# Patient Record
Sex: FEMALE | Race: BLACK OR AFRICAN AMERICAN | Employment: FULL TIME | URBAN - METROPOLITAN AREA
[De-identification: names, ages, dates, MRNs, and addresses within clinical notes are randomized per-mention and may not be internally consistent; named-entity substitution may affect disease eponyms.]

---

## 2019-11-13 ENCOUNTER — HOSPITAL ENCOUNTER (EMERGENCY)
Age: 24
Discharge: HOME | End: 2019-11-13
Attending: EMERGENCY MEDICINE

## 2019-11-13 VITALS
BODY MASS INDEX: 23.04 KG/M2 | WEIGHT: 130 LBS | TEMPERATURE: 98.4 F | SYSTOLIC BLOOD PRESSURE: 149 MMHG | RESPIRATION RATE: 16 BRPM | HEART RATE: 83 BPM | OXYGEN SATURATION: 100 % | HEIGHT: 63 IN | DIASTOLIC BLOOD PRESSURE: 93 MMHG

## 2019-11-13 DIAGNOSIS — L02.31 GLUTEAL ABSCESS: Primary | ICD-10-CM

## 2019-11-13 PROCEDURE — 4500000022 HC ED LEVEL 2 PROCEDURE

## 2019-11-13 PROCEDURE — 6360000002 HC RX W HCPCS: Performed by: PHYSICIAN ASSISTANT

## 2019-11-13 PROCEDURE — 96372 THER/PROPH/DIAG INJ SC/IM: CPT

## 2019-11-13 PROCEDURE — 99282 EMERGENCY DEPT VISIT SF MDM: CPT

## 2019-11-13 RX ORDER — LIDOCAINE HYDROCHLORIDE AND EPINEPHRINE 10; 10 MG/ML; UG/ML
20 INJECTION, SOLUTION INFILTRATION; PERINEURAL ONCE
Status: DISCONTINUED | OUTPATIENT
Start: 2019-11-13 | End: 2019-11-13 | Stop reason: HOSPADM

## 2019-11-13 RX ORDER — KETOROLAC TROMETHAMINE 30 MG/ML
15 INJECTION, SOLUTION INTRAMUSCULAR; INTRAVENOUS ONCE
Status: COMPLETED | OUTPATIENT
Start: 2019-11-13 | End: 2019-11-13

## 2019-11-13 RX ORDER — CEPHALEXIN 500 MG/1
500 CAPSULE ORAL 4 TIMES DAILY
Qty: 28 CAPSULE | Refills: 0 | Status: SHIPPED | OUTPATIENT
Start: 2019-11-13 | End: 2019-11-20

## 2019-11-13 RX ORDER — SULFAMETHOXAZOLE AND TRIMETHOPRIM 800; 160 MG/1; MG/1
1 TABLET ORAL 2 TIMES DAILY
Qty: 14 TABLET | Refills: 0 | Status: SHIPPED | OUTPATIENT
Start: 2019-11-13 | End: 2019-11-20

## 2019-11-13 RX ADMIN — KETOROLAC TROMETHAMINE 15 MG: 30 INJECTION, SOLUTION INTRAMUSCULAR at 19:11

## 2019-11-13 ASSESSMENT — PAIN DESCRIPTION - DESCRIPTORS
DESCRIPTORS: JABBING;NAGGING
DESCRIPTORS: ACHING;CONSTANT

## 2019-11-13 ASSESSMENT — PAIN DESCRIPTION - PROGRESSION: CLINICAL_PROGRESSION: NOT CHANGED

## 2019-11-13 ASSESSMENT — PAIN DESCRIPTION - PAIN TYPE
TYPE: ACUTE PAIN
TYPE: ACUTE PAIN

## 2019-11-13 ASSESSMENT — PAIN SCALES - GENERAL
PAINLEVEL_OUTOF10: 9
PAINLEVEL_OUTOF10: 7
PAINLEVEL_OUTOF10: 9

## 2019-11-13 ASSESSMENT — PAIN DESCRIPTION - FREQUENCY: FREQUENCY: CONTINUOUS

## 2019-11-13 ASSESSMENT — PAIN DESCRIPTION - ONSET: ONSET: ON-GOING

## 2019-11-13 ASSESSMENT — PAIN DESCRIPTION - LOCATION
LOCATION: BUTTOCKS
LOCATION: BACK

## 2019-11-13 ASSESSMENT — PAIN DESCRIPTION - ORIENTATION: ORIENTATION: MID;LOWER

## 2019-11-15 ENCOUNTER — TELEPHONE (OUTPATIENT)
Dept: SURGERY | Age: 24
End: 2019-11-15

## 2020-04-22 ENCOUNTER — TELEPHONE (OUTPATIENT)
Dept: SURGERY | Age: 25
End: 2020-04-22

## 2021-05-24 ENCOUNTER — HOSPITAL ENCOUNTER (EMERGENCY)
Age: 26
Discharge: HOME | End: 2021-05-24
Attending: EMERGENCY MEDICINE

## 2021-05-24 VITALS
RESPIRATION RATE: 16 BRPM | OXYGEN SATURATION: 99 % | BODY MASS INDEX: 25.52 KG/M2 | HEART RATE: 97 BPM | TEMPERATURE: 98 F | SYSTOLIC BLOOD PRESSURE: 139 MMHG | HEIGHT: 60 IN | DIASTOLIC BLOOD PRESSURE: 93 MMHG | WEIGHT: 130 LBS

## 2021-05-24 DIAGNOSIS — R10.30 LOWER ABDOMINAL PAIN: Primary | ICD-10-CM

## 2021-05-24 LAB
ABO/RH: NORMAL
ANION GAP SERPL CALCULATED.3IONS-SCNC: 12 MMOL/L (ref 3–16)
ANTIBODY SCREEN: NORMAL
BACTERIA WET PREP: NORMAL
BASOPHILS ABSOLUTE: 0.1 K/UL (ref 0–0.2)
BASOPHILS RELATIVE PERCENT: 0.8 %
BILIRUBIN URINE: NEGATIVE
BLOOD, URINE: NEGATIVE
BUN BLDV-MCNC: 9 MG/DL (ref 7–20)
CALCIUM SERPL-MCNC: 9.2 MG/DL (ref 8.3–10.6)
CHLORIDE BLD-SCNC: 102 MMOL/L (ref 99–110)
CLARITY: CLEAR
CLUE CELLS: NORMAL
CO2: 22 MMOL/L (ref 21–32)
COLOR: YELLOW
CREAT SERPL-MCNC: 0.8 MG/DL (ref 0.6–1.1)
EOSINOPHILS ABSOLUTE: 0.2 K/UL (ref 0–0.6)
EOSINOPHILS RELATIVE PERCENT: 2 %
EPITHELIAL CELLS WET PREP: NORMAL
GFR AFRICAN AMERICAN: >60
GFR NON-AFRICAN AMERICAN: >60
GLUCOSE BLD-MCNC: 77 MG/DL (ref 70–99)
GLUCOSE URINE: NEGATIVE MG/DL
HCG QUALITATIVE: NEGATIVE
HCT VFR BLD CALC: 32.3 % (ref 36–48)
HEMOGLOBIN: 10.3 G/DL (ref 12–16)
KETONES, URINE: NEGATIVE MG/DL
LEUKOCYTE ESTERASE, URINE: NEGATIVE
LYMPHOCYTES ABSOLUTE: 3.5 K/UL (ref 1–5.1)
LYMPHOCYTES RELATIVE PERCENT: 37.7 %
MAGNESIUM: 2 MG/DL (ref 1.8–2.4)
MCH RBC QN AUTO: 23.8 PG (ref 26–34)
MCHC RBC AUTO-ENTMCNC: 31.8 G/DL (ref 31–36)
MCV RBC AUTO: 74.8 FL (ref 80–100)
MICROSCOPIC EXAMINATION: NORMAL
MONOCYTES ABSOLUTE: 1 K/UL (ref 0–1.3)
MONOCYTES RELATIVE PERCENT: 10.3 %
NEUTROPHILS ABSOLUTE: 4.6 K/UL (ref 1.7–7.7)
NEUTROPHILS RELATIVE PERCENT: 49.2 %
NITRITE, URINE: NEGATIVE
PDW BLD-RTO: 16.7 % (ref 12.4–15.4)
PH UA: 7 (ref 5–8)
PLATELET # BLD: 341 K/UL (ref 135–450)
PMV BLD AUTO: 9 FL (ref 5–10.5)
POTASSIUM REFLEX MAGNESIUM: 3.5 MMOL/L (ref 3.5–5.1)
PROTEIN UA: NEGATIVE MG/DL
RBC # BLD: 4.32 M/UL (ref 4–5.2)
RBC WET PREP: NORMAL
SODIUM BLD-SCNC: 136 MMOL/L (ref 136–145)
SOURCE WET PREP: NORMAL
SPECIFIC GRAVITY UA: 1.01 (ref 1–1.03)
TRICHOMONAS PREP: NORMAL
URINE TYPE: NORMAL
UROBILINOGEN, URINE: 0.2 E.U./DL
WBC # BLD: 9.3 K/UL (ref 4–11)
WBC WET PREP: NORMAL
YEAST WET PREP: NORMAL

## 2021-05-24 PROCEDURE — 87591 N.GONORRHOEAE DNA AMP PROB: CPT

## 2021-05-24 PROCEDURE — 84703 CHORIONIC GONADOTROPIN ASSAY: CPT

## 2021-05-24 PROCEDURE — 86900 BLOOD TYPING SEROLOGIC ABO: CPT

## 2021-05-24 PROCEDURE — 83735 ASSAY OF MAGNESIUM: CPT

## 2021-05-24 PROCEDURE — 87210 SMEAR WET MOUNT SALINE/INK: CPT

## 2021-05-24 PROCEDURE — 80048 BASIC METABOLIC PNL TOTAL CA: CPT

## 2021-05-24 PROCEDURE — 86850 RBC ANTIBODY SCREEN: CPT

## 2021-05-24 PROCEDURE — 99283 EMERGENCY DEPT VISIT LOW MDM: CPT

## 2021-05-24 PROCEDURE — 86901 BLOOD TYPING SEROLOGIC RH(D): CPT

## 2021-05-24 PROCEDURE — 85025 COMPLETE CBC W/AUTO DIFF WBC: CPT

## 2021-05-24 PROCEDURE — 81003 URINALYSIS AUTO W/O SCOPE: CPT

## 2021-05-24 ASSESSMENT — ENCOUNTER SYMPTOMS
SHORTNESS OF BREATH: 0
NAUSEA: 0
CONSTIPATION: 0
ABDOMINAL PAIN: 0
WHEEZING: 0
VOMITING: 0
BLOOD IN STOOL: 0
EYE ITCHING: 0
COUGH: 0
DIARRHEA: 0
EYE DISCHARGE: 0
RHINORRHEA: 0
SORE THROAT: 0

## 2021-05-24 ASSESSMENT — PAIN DESCRIPTION - DESCRIPTORS: DESCRIPTORS: CRAMPING

## 2021-05-24 ASSESSMENT — PAIN DESCRIPTION - LOCATION: LOCATION: ABDOMEN

## 2021-05-24 NOTE — ED PROVIDER NOTES
[facility] RESIDENT NOTE     Date of evaluation: 5/24/2021    ADDENDUM:      Care of this patient was assumed from Dr. Lewis Grayson. The patient was seen for Abdominal Pain (pt reports lower abd pain/cramping x few days with some spotting. last period approx 2 weeks ago. )  . The patient's initial evaluation and plan have been discussed with the prior provider who initially evaluated the patient. Nursing Notes, Past Medical Hx, Past Surgical Hx, Social Hx, Allergies, and Family Hx were all reviewed.     Diagnostic Results     RADIOLOGY:  No orders to display       LABS:   Results for orders placed or performed during the hospital encounter of 05/24/21   Wet prep, genital    Specimen: Vaginal   Result Value Ref Range    Trichomonas Prep None Seen     Yeast, Wet Prep None Seen     Clue Cells, Wet Prep None Seen     WBC, Wet Prep 1+     RBC, Wet Prep None Seen     Epi Cells 2+     Bacteria 2+     Source Wet Prep Vaginal    CBC Auto Differential   Result Value Ref Range    WBC 9.3 4.0 - 11.0 K/uL    RBC 4.32 4.00 - 5.20 M/uL    Hemoglobin 10.3 (L) 12.0 - 16.0 g/dL    Hematocrit 32.3 (L) 36.0 - 48.0 %    MCV 74.8 (L) 80.0 - 100.0 fL    MCH 23.8 (L) 26.0 - 34.0 pg    MCHC 31.8 31.0 - 36.0 g/dL    RDW 16.7 (H) 12.4 - 15.4 %    Platelets 380 767 - 954 K/uL    MPV 9.0 5.0 - 10.5 fL    Neutrophils % 49.2 %    Lymphocytes % 37.7 %    Monocytes % 10.3 %    Eosinophils % 2.0 %    Basophils % 0.8 %    Neutrophils Absolute 4.6 1.7 - 7.7 K/uL    Lymphocytes Absolute 3.5 1.0 - 5.1 K/uL    Monocytes Absolute 1.0 0.0 - 1.3 K/uL    Eosinophils Absolute 0.2 0.0 - 0.6 K/uL    Basophils Absolute 0.1 0.0 - 0.2 K/uL   Basic Metabolic Panel w/ Reflex to MG   Result Value Ref Range    Sodium 136 136 - 145 mmol/L    Potassium reflex Magnesium 3.5 3.5 - 5.1 mmol/L    Chloride 102 99 - 110 mmol/L    CO2 22 21 - 32 mmol/L    Anion Gap 12 3 - 16    Glucose 77 70 - 99 mg/dL    BUN 9 7 - 20 mg/dL    CREATININE 0.8 0.6 - 1.1 mg/dL    GFR Non-African American >60 >60    GFR African American >60 >60    Calcium 9.2 8.3 - 10.6 mg/dL   Urinalysis, reflex to microscopic   Result Value Ref Range    Color, UA Yellow Straw/Yellow    Clarity, UA Clear Clear    Glucose, Ur Negative Negative mg/dL    Bilirubin Urine Negative Negative    Ketones, Urine Negative Negative mg/dL    Specific Gravity, UA 1.010 1.005 - 1.030    Blood, Urine Negative Negative    pH, UA 7.0 5.0 - 8.0    Protein, UA Negative Negative mg/dL    Urobilinogen, Urine 0.2 <2.0 E.U./dL    Nitrite, Urine Negative Negative    Leukocyte Esterase, Urine Negative Negative    Microscopic Examination Not Indicated     Urine Type Voided    HCG Qualitative, Serum   Result Value Ref Range    hCG Qual Negative Detects HCG level >10 MIU/mL   Magnesium   Result Value Ref Range    Magnesium 2.00 1.80 - 2.40 mg/dL   TYPE AND SCREEN   Result Value Ref Range    ABO/Rh B POS     Antibody Screen NEG        RECENT VITALS:  BP: (!) 139/93, Temp: 98 °F (36.7 °C), Pulse: 97, Resp: 16, SpO2: 99 %     Procedures       ED Course     The patient was given the following medications:  No orders of the defined types were placed in this encounter. CONSULTS:  None    MEDICAL DECISION MAKING / ASSESSMENT / Alvia Paget is a 30 y.o. female with no significant past medical history who presents to the emergency department for crampy lower abdominal pain. Patient's last menstrual period was approximately 2 weeks ago. She is sexually active and does not use contraception. She is also requesting STI testing. Per my outgoing colleague, pelvic exam was unremarkable. Laboratory testing, including CBC, BMP, UA, hCG, wet prep, and GC/CT were ordered. These are pending at this time. On my assessment, patient's abdomen is soft. Vital signs are stable. Laboratory studies were reviewed and are reassuring. UTI negative for evidence of infection.   Wet prep is negative for evidence of vaginitis. hCG is negative. DNAP is pending-patient was instructed to f/u results on MyChart, also informed that she will receive a call if these are positive. She was given referral to a primary care provider and I also reviewed strict return precautions with the patient at the bedside. This patient was also evaluated by the attending physician. All care plans were discussed and agreed upon. Clinical Impression     1.  Lower abdominal pain        Disposition     PATIENT REFERRED TO:  The [facility] Emergency Department  [address] [place] [address]  [address]  [phone]    As needed, If symptoms worsen    Francesca Segal MD  [facility]  [phone]      to establish primary care follow-up      DISCHARGE MEDICATIONS:  New Prescriptions    No medications on file       DISPOSITION Decision To Discharge 05/24/2021 08:51:53 PM       Allyson Vigil MD  Resident  05/24/21 2940

## 2021-05-24 NOTE — ED PROVIDER NOTES
[address] [facility] RESIDENT NOTE       Date of evaluation: 5/24/2021    Chief Complaint     Abdominal Pain (pt reports lower abd pain/cramping x few days with some spotting. last period approx 2 weeks ago. )      History of Present Illness     Candance Dorina Schlichter is a 30 y.o. female who presents 6 days of bilateral lower abdominal pain and cramping similar to that of a.,  She also endorses a 1 singular episode of vaginal spotting after intercourse 2 days ago that has since resolved. She denies any fevers, chills, dysuria, vaginal pain or abnormal vaginal discharge, new sexual partners, genital lesions, nausea, vomiting. Last menstrual period was 2 weeks ago. .    Review of Systems     Review of Systems   Constitutional: Negative for activity change, chills and fever. HENT: Negative for congestion, rhinorrhea and sore throat. Eyes: Negative for discharge and itching. Respiratory: Negative for cough, shortness of breath and wheezing. Cardiovascular: Negative for chest pain and palpitations. Gastrointestinal: Negative for abdominal pain, blood in stool, constipation, diarrhea, nausea and vomiting. Endocrine: Negative for cold intolerance and heat intolerance. Genitourinary: Positive for vaginal bleeding. Negative for difficulty urinating, dysuria, flank pain, frequency, genital sores, urgency, vaginal discharge and vaginal pain. Musculoskeletal: Negative for gait problem and joint swelling. Skin: Negative for pallor and rash. Neurological: Negative for dizziness, light-headedness and headaches. Past Medical, Surgical, Family, and Social History     She has a past medical history of Asthma. She has no past surgical history on file. Her family history is not on file. She reports that she has never smoked. She has never used smokeless tobacco. She reports current alcohol use. She reports that she does not use drugs.     Medications     Previous deficit. Coordination: Coordination normal.         DiagnosticResults     EKG   None    RADIOLOGY:  No orders to display       LABS:   No results found for this visit on 05/24/21. ED BEDSIDE ULTRASOUND:  None    RECENT VITALS:  BP: (!) 139/93, Temp: 98 °F (36.7 °C), Pulse: 97,Resp: 16, SpO2: 99 %     Procedures     None    ED Course     Nursing Notes, Past Medical Hx, Past Surgical Hx, Social Hx, Allergies, and Family Hx were reviewed. The patient was given the followingmedications:  No orders of the defined types were placed in this encounter. CONSULTS:  None    MEDICAL DECISION MAKING / ASSESSMENT / Brandeebautista Mckinnon is a 30 y.o. female with lower abdominal pain and 1 episode of vaginal spotting after intercourse 2 days ago. Exam reassuring without abnormality. Wet prep sent and pending along with gonorrhea and chlamydia as patient requested however I do not see indication on history or physical exam to warrant empiric treatment. Blood work and vaginal swab results are pending at this time. Patient will be signed out to Dr. Ryan Robin. This patient was also evaluated by the attending physician. All care plans werediscussed and agreed upon. Clinical Impression     Lower abdominal pain    Disposition     PATIENT REFERRED TO:  No follow-up provider specified.     DISCHARGE MEDICATIONS:  New Prescriptions    No medications on file       DISPOSITION     Pending evaluation by oncoming provider and results of tests       Azalia Lizama MD  Resident  05/24/21 6446

## 2021-05-26 LAB — N. GONORRHOEAE DNA: NEGATIVE

## 2021-07-10 ENCOUNTER — HOSPITAL ENCOUNTER (EMERGENCY)
Age: 26
Discharge: HOME | End: 2021-07-10
Attending: EMERGENCY MEDICINE

## 2021-07-10 VITALS
SYSTOLIC BLOOD PRESSURE: 142 MMHG | BODY MASS INDEX: 23.92 KG/M2 | TEMPERATURE: 98.8 F | OXYGEN SATURATION: 97 % | WEIGHT: 130 LBS | HEART RATE: 97 BPM | RESPIRATION RATE: 16 BRPM | DIASTOLIC BLOOD PRESSURE: 89 MMHG | HEIGHT: 62 IN

## 2021-07-10 DIAGNOSIS — R10.9 ABDOMINAL PAIN, UNSPECIFIED ABDOMINAL LOCATION: Primary | ICD-10-CM

## 2021-07-10 LAB
A/G RATIO: 1.3 (ref 1.1–2.2)
ALBUMIN SERPL-MCNC: 4 G/DL (ref 3.4–5)
ALP BLD-CCNC: 54 U/L (ref 40–129)
ALT SERPL-CCNC: 9 U/L (ref 10–40)
ANION GAP SERPL CALCULATED.3IONS-SCNC: 12 MMOL/L (ref 3–16)
AST SERPL-CCNC: 21 U/L (ref 15–37)
BACTERIA WET PREP: NORMAL
BASOPHILS ABSOLUTE: 0.1 K/UL (ref 0–0.2)
BASOPHILS RELATIVE PERCENT: 1 %
BILIRUB SERPL-MCNC: 0.4 MG/DL (ref 0–1)
BILIRUBIN URINE: NEGATIVE
BLOOD, URINE: NEGATIVE
BUN BLDV-MCNC: 10 MG/DL (ref 7–20)
CALCIUM SERPL-MCNC: 9.4 MG/DL (ref 8.3–10.6)
CHLORIDE BLD-SCNC: 107 MMOL/L (ref 99–110)
CLARITY: CLEAR
CLUE CELLS: NORMAL
CO2: 21 MMOL/L (ref 21–32)
COLOR: YELLOW
CREAT SERPL-MCNC: 0.7 MG/DL (ref 0.6–1.1)
EOSINOPHILS ABSOLUTE: 0.1 K/UL (ref 0–0.6)
EOSINOPHILS RELATIVE PERCENT: 1.9 %
EPITHELIAL CELLS WET PREP: NORMAL
GFR AFRICAN AMERICAN: >60
GFR NON-AFRICAN AMERICAN: >60
GLOBULIN: 3 G/DL
GLUCOSE BLD-MCNC: 103 MG/DL (ref 70–99)
GLUCOSE URINE: NEGATIVE MG/DL
HCG(URINE) PREGNANCY TEST: NEGATIVE
HCT VFR BLD CALC: 30.8 % (ref 36–48)
HEMOGLOBIN: 9.8 G/DL (ref 12–16)
KETONES, URINE: ABNORMAL MG/DL
LEUKOCYTE ESTERASE, URINE: NEGATIVE
LYMPHOCYTES ABSOLUTE: 3.3 K/UL (ref 1–5.1)
LYMPHOCYTES RELATIVE PERCENT: 41.4 %
MCH RBC QN AUTO: 23.6 PG (ref 26–34)
MCHC RBC AUTO-ENTMCNC: 31.8 G/DL (ref 31–36)
MCV RBC AUTO: 74.2 FL (ref 80–100)
MICROSCOPIC EXAMINATION: ABNORMAL
MONOCYTES ABSOLUTE: 0.7 K/UL (ref 0–1.3)
MONOCYTES RELATIVE PERCENT: 9.2 %
NEUTROPHILS ABSOLUTE: 3.7 K/UL (ref 1.7–7.7)
NEUTROPHILS RELATIVE PERCENT: 46.5 %
NITRITE, URINE: NEGATIVE
PDW BLD-RTO: 18.1 % (ref 12.4–15.4)
PH UA: 7 (ref 5–8)
PLATELET # BLD: 319 K/UL (ref 135–450)
PMV BLD AUTO: 8.8 FL (ref 5–10.5)
POTASSIUM REFLEX MAGNESIUM: 3.7 MMOL/L (ref 3.5–5.1)
PROTEIN UA: NEGATIVE MG/DL
RBC # BLD: 4.15 M/UL (ref 4–5.2)
RBC WET PREP: NORMAL
SODIUM BLD-SCNC: 140 MMOL/L (ref 136–145)
SOURCE WET PREP: NORMAL
SPECIFIC GRAVITY UA: 1.02 (ref 1–1.03)
TOTAL PROTEIN: 7 G/DL (ref 6.4–8.2)
TRICHOMONAS PREP: NORMAL
URINE TYPE: ABNORMAL
UROBILINOGEN, URINE: 1 E.U./DL
WBC # BLD: 7.9 K/UL (ref 4–11)
WBC WET PREP: NORMAL
YEAST WET PREP: NORMAL

## 2021-07-10 PROCEDURE — 87591 N.GONORRHOEAE DNA AMP PROB: CPT

## 2021-07-10 PROCEDURE — 6360000002 HC RX W HCPCS: Performed by: STUDENT IN AN ORGANIZED HEALTH CARE EDUCATION/TRAINING PROGRAM

## 2021-07-10 PROCEDURE — 87210 SMEAR WET MOUNT SALINE/INK: CPT

## 2021-07-10 PROCEDURE — 87390 HIV-1 AG IA: CPT

## 2021-07-10 PROCEDURE — 36415 COLL VENOUS BLD VENIPUNCTURE: CPT

## 2021-07-10 PROCEDURE — 96372 THER/PROPH/DIAG INJ SC/IM: CPT

## 2021-07-10 PROCEDURE — 96374 THER/PROPH/DIAG INJ IV PUSH: CPT

## 2021-07-10 PROCEDURE — 99283 EMERGENCY DEPT VISIT LOW MDM: CPT

## 2021-07-10 PROCEDURE — 6370000000 HC RX 637 (ALT 250 FOR IP): Performed by: STUDENT IN AN ORGANIZED HEALTH CARE EDUCATION/TRAINING PROGRAM

## 2021-07-10 PROCEDURE — 81003 URINALYSIS AUTO W/O SCOPE: CPT

## 2021-07-10 PROCEDURE — 87491 CHLMYD TRACH DNA AMP PROBE: CPT

## 2021-07-10 PROCEDURE — 2500000003 HC RX 250 WO HCPCS: Performed by: STUDENT IN AN ORGANIZED HEALTH CARE EDUCATION/TRAINING PROGRAM

## 2021-07-10 PROCEDURE — 84703 CHORIONIC GONADOTROPIN ASSAY: CPT

## 2021-07-10 PROCEDURE — 86702 HIV-2 ANTIBODY: CPT

## 2021-07-10 PROCEDURE — 85025 COMPLETE CBC W/AUTO DIFF WBC: CPT

## 2021-07-10 PROCEDURE — 80053 COMPREHEN METABOLIC PANEL: CPT

## 2021-07-10 PROCEDURE — 86780 TREPONEMA PALLIDUM: CPT

## 2021-07-10 PROCEDURE — 86701 HIV-1ANTIBODY: CPT

## 2021-07-10 RX ORDER — KETOROLAC TROMETHAMINE 30 MG/ML
15 INJECTION, SOLUTION INTRAMUSCULAR; INTRAVENOUS ONCE
Status: COMPLETED | OUTPATIENT
Start: 2021-07-10 | End: 2021-07-10

## 2021-07-10 RX ORDER — DOXYCYCLINE HYCLATE 100 MG
100 TABLET ORAL 2 TIMES DAILY
Qty: 14 TABLET | Refills: 0 | Status: SHIPPED | OUTPATIENT
Start: 2021-07-10 | End: 2021-07-17

## 2021-07-10 RX ORDER — DOXYCYCLINE 100 MG/1
100 CAPSULE ORAL ONCE
Status: COMPLETED | OUTPATIENT
Start: 2021-07-10 | End: 2021-07-10

## 2021-07-10 RX ADMIN — KETOROLAC TROMETHAMINE 15 MG: 30 INJECTION, SOLUTION INTRAMUSCULAR at 21:45

## 2021-07-10 RX ADMIN — DOXYCYCLINE 100 MG: 100 CAPSULE ORAL at 23:20

## 2021-07-10 RX ADMIN — LIDOCAINE HYDROCHLORIDE 500 MG: 10 INJECTION, SOLUTION EPIDURAL; INFILTRATION; INTRACAUDAL; PERINEURAL at 23:20

## 2021-07-10 ASSESSMENT — PAIN DESCRIPTION - LOCATION: LOCATION: ABDOMEN

## 2021-07-10 ASSESSMENT — PAIN DESCRIPTION - ORIENTATION: ORIENTATION: LOWER

## 2021-07-10 ASSESSMENT — PAIN SCALES - GENERAL
PAINLEVEL_OUTOF10: 5
PAINLEVEL_OUTOF10: 5

## 2021-07-11 LAB
HIV AG/AB: NORMAL
HIV ANTIGEN: NORMAL
HIV-1 ANTIBODY: NORMAL
HIV-2 AB: NORMAL
TOTAL SYPHILLIS IGG/IGM: NORMAL

## 2021-07-11 NOTE — ED PROVIDER NOTES
ED Attending Attestation Note     Date of evaluation: 7/10/2021    This patient was seen by the resident. I have seen and examined the patient, agree with the workup, evaluation, management and diagnosis. The care plan has been discussed. My assessment reveals complaints of abdominal pain. Patient states she has been having lower abdominal pain for approximately month and a half. She was seen in the emerge department with unremarkable work-up a month ago. Patient has a soft abdomen on exam with mild tenderness to the bilateral lower quadrants. Will check laboratory studies.      Rosa Clemens MD  07/10/21 7714

## 2021-07-11 NOTE — ED TRIAGE NOTES
Pt c/o lower abdominal pain that has been increasing over the last few weeks. Denies N/V or diarrhea. Noticing increasing urinary frequency.

## 2021-07-11 NOTE — ED PROVIDER NOTES
[address] [facility] RESIDENT NOTE       Date of evaluation: 7/10/2021    Chief Complaint     Abdominal Pain      History of Present Illness     Caitlyn Rossi is a 27 y.o. female with unremarkable past medical history who presents to the emergency department with chief complaint of abdominal pain. Patient reports this pain has been occurring intermittently for several months now. It is described as radiating in a band across her lower abdomen. It occasionally worsens with heavy meals. She endorses loose stools. Denies nausea or vomiting. Has no fevers, chills, difficulty breathing, chest pain, vaginal discharge. She does report spotting in between her menstrual cycles. Her last menstrual cycle was 2 weeks ago. She has concerns about her partner's fidelity and reports that he recently reported penile discharge. Review of Systems     Please see HPI for pertinent positives and negatives. All other systems reviewed and negative. Past Medical, Surgical, Family, and Social History     She has a past medical history of Asthma. She has no past surgical history on file. Her family history is not on file. She reports that she has never smoked. She has never used smokeless tobacco. She reports current alcohol use. She reports that she does not use drugs. Medications     Discharge Medication List as of 7/10/2021 11:31 PM          Allergies     She has No Known Allergies. Physical Exam     INITIAL VITALS: BP: (!) 142/89, Temp: 98.8 °F (37.1 °C), Pulse: 97, Resp: 16, SpO2: 97 %   Physical Exam  Constitutional:       General: She is not in acute distress. Appearance: She is not ill-appearing. HENT:      Head: Normocephalic and atraumatic. Cardiovascular:      Rate and Rhythm: Normal rate and regular rhythm. Pulmonary:      Effort: Pulmonary effort is normal.   Abdominal:      General: Abdomen is flat. There is no distension.       Palpations: Abdomen is soft. Tenderness: There is no abdominal tenderness. There is no right CVA tenderness, left CVA tenderness or guarding. Genitourinary:     Vagina: Normal.      Cervix: No cervical motion tenderness or friability. Uterus: Normal.       Comments: Small amount of white discharge noted in the vaginal vault. Neurological:      Mental Status: She is alert.          DiagnosticResults     EKG   None obtained    RADIOLOGY:  No orders to display       LABS:   Results for orders placed or performed during the hospital encounter of 07/10/21   Wet prep, genital    Specimen: Vaginal   Result Value Ref Range    Trichomonas Prep None Seen     Yeast, Wet Prep None Seen     Clue Cells, Wet Prep None Seen     WBC, Wet Prep <1+     RBC, Wet Prep <1+     Epi Cells 1+     Bacteria <1+     Source Wet Prep Vaginal    CBC auto differential   Result Value Ref Range    WBC 7.9 4.0 - 11.0 K/uL    RBC 4.15 4.00 - 5.20 M/uL    Hemoglobin 9.8 (L) 12.0 - 16.0 g/dL    Hematocrit 30.8 (L) 36.0 - 48.0 %    MCV 74.2 (L) 80.0 - 100.0 fL    MCH 23.6 (L) 26.0 - 34.0 pg    MCHC 31.8 31.0 - 36.0 g/dL    RDW 18.1 (H) 12.4 - 15.4 %    Platelets 242 319 - 335 K/uL    MPV 8.8 5.0 - 10.5 fL    Neutrophils % 46.5 %    Lymphocytes % 41.4 %    Monocytes % 9.2 %    Eosinophils % 1.9 %    Basophils % 1.0 %    Neutrophils Absolute 3.7 1.7 - 7.7 K/uL    Lymphocytes Absolute 3.3 1.0 - 5.1 K/uL    Monocytes Absolute 0.7 0.0 - 1.3 K/uL    Eosinophils Absolute 0.1 0.0 - 0.6 K/uL    Basophils Absolute 0.1 0.0 - 0.2 K/uL   Comprehensive Metabolic Panel w/ Reflex to MG   Result Value Ref Range    Sodium 140 136 - 145 mmol/L    Potassium reflex Magnesium 3.7 3.5 - 5.1 mmol/L    Chloride 107 99 - 110 mmol/L    CO2 21 21 - 32 mmol/L    Anion Gap 12 3 - 16    Glucose 103 (H) 70 - 99 mg/dL    BUN 10 7 - 20 mg/dL    CREATININE 0.7 0.6 - 1.1 mg/dL    GFR Non-African American >60 >60    GFR African American >60 >60    Calcium 9.4 8.3 - 10.6 mg/dL    Total Protein 7.0 CONSULTS:  None    MEDICAL DECISION MAKING / ASSESSMENT / David Alexandr is a 27 y.o. female with unremarkable past medical history presents to the emergency department with continued lower abdominal pain. She has had this pain for several months and has been evaluated in this emergency department for similar symptoms. Denies any acute worsening of the pain, but presents for reevaluation given the persistence of her pain. Work-up from prior visit is reviewed, chlamydia swab noted to not be obtained during that visit. She is concerned that her partner has described penile discharge and is not sure if she may have been exposed to a sexually transmitted disease.  exam is overall reassuring against PID, given absence of cervical motion tenderness or friability of the cervix. Swabs were obtained. Laboratory work-up is unchanged compared to prior. Wet prep is negative. Chlamydia and gonorrhea swabs are sent, as well as HIV and syphilis testing per patient's request.  Given reassuring exam, empiric treatment versus awaiting results of swabs are discussed with patient, who opts for empiric treatment. She is given a dose of ceftriaxone and prescribed a course of doxycycline as well as given the initial dose of doxycycline here in the emergency department. She remains well-appearing with benign abdominal exam.  She is encouraged to follow-up with her primary care physician and is agreeable to this plan. Patient is discharged in stable condition with return precautions. This patient was also evaluated by the attending physician. All care plans werediscussed and agreed upon. Clinical Impression     1.  Abdominal pain, unspecified abdominal location        Disposition     PATIENT REFERRED TO:  [facility]) Pre-Services  [phone]          DISCHARGE MEDICATIONS:  Discharge Medication List as of 7/10/2021 11:31 PM      START taking these medications    Details   doxycycline hyclate (VIBRA-TABS) 100 MG tablet Take 1 tablet by mouth 2 times daily for 7 days, Disp-14 tablet, R-0Print             DISPOSITION Decision To Discharge 07/10/2021 11:31:57 PM      Lizeth Ram MD  Resident  07/11/21 0923

## 2021-07-12 LAB
C TRACH DNA GENITAL QL NAA+PROBE: NEGATIVE
N. GONORRHOEAE DNA: NEGATIVE

## 2022-08-12 ENCOUNTER — HOSPITAL ENCOUNTER (EMERGENCY)
Age: 27
Discharge: HOME | End: 2022-08-12
Attending: EMERGENCY MEDICINE

## 2022-08-12 VITALS
RESPIRATION RATE: 16 BRPM | WEIGHT: 140.21 LBS | HEIGHT: 59 IN | DIASTOLIC BLOOD PRESSURE: 81 MMHG | BODY MASS INDEX: 28.27 KG/M2 | SYSTOLIC BLOOD PRESSURE: 136 MMHG | TEMPERATURE: 98.9 F | OXYGEN SATURATION: 100 % | HEART RATE: 97 BPM

## 2022-08-12 DIAGNOSIS — Z34.90 PREGNANCY, UNSPECIFIED GESTATIONAL AGE: Primary | ICD-10-CM

## 2022-08-12 PROCEDURE — 99283 EMERGENCY DEPT VISIT LOW MDM: CPT

## 2022-08-12 ASSESSMENT — PAIN - FUNCTIONAL ASSESSMENT: PAIN_FUNCTIONAL_ASSESSMENT: NONE - DENIES PAIN

## 2022-08-12 NOTE — DISCHARGE INSTRUCTIONS
Schedule an appoint with your women's doctor of choice soon as possible. You can tell them that you have between 16-20 weeks pregnant you should be able to get an appointment as soon as possible within the next week. You may use any team of your choice, 2 options have been provided to you. Begin taking an over-the-counter prenatal vitamin supplement daily. Try not to worry too much about your lack of insurance. Any women's health team that you see will be able to help you establish insurance with Medicaid or other financial options. Discussed this as soon as possible with your team when to establish an appointment. An unexpected pregnancy can be difficult to manage alone. You will receive many opinions from family, friends, medical staff, and strangers on what you should do next. It can be confusing, stressful, and overwhelming. Do not take this decision alone. There are amazing people and resources available to help mothers just like you. Listed below are several resources in the [address], [address] [facility] area who have free resources to help you manage your pregnancy going forward (including free ultrasounds, diapers, vitamins, etc.). [address] [facility]   [address], [address]   [phone]   Confidential services offered:  -Free pregnancy testing  -[place] baby packet & monthly distribution of supplies  -Referrals including housing, shelters, food, adoption information     On [facility] Side:   Pregnancy [address] [facility] [address] [facility] [address]   [phone]   [email or web address]   -Free and confidential pregnancy testing, ultrasounds and STD testing    -Medical, adoption and legal referrals   -Information on  alternatives   -Material assistance   -First time parenting classes   -Post  recovery   -Mens mentoring     [facility]   [address]  [address], [address] [place] [address] [phone]. help ([phone]) After Hours Help Line: 517.427. FQD3(4310) [email or web address].[email or web address]   [email or web address] ?   -Free pregnancy tests and ultrasounds   -Medical and social service referrals   -Adoption information and referrals   -Parenting/financial life skills classes   -Baby and maternity supplies   -Spiritual growth and sexual integrity programs   -Post  recovery    In [place]:   [place], [facility] [address] [facility] [address] [facility]   [phone]   www. [email or web address] org   -Free pregnancy tests and ultrasounds   -Parenting classes, material services   -Referrals for prenatal care and community services     [address] [facility], [address] [facility] [place], [address] [place] [address] [phone]   -Pregnancy consultation, education    On [place]:   Pregnancy [address] [facility]. [facility]   [phone]   [email or web address]   -Free pregnancy testing & referral for ultrasounds   -Positive alternatives to    -Medical and  referrals   -Parenting classes   -Earn-while-you-learn baby items   -Free limited STD testing   -Male mentoring program   -Spiritual growth and sexual integrity     In [place] [address].:   [facility], Pregnancy Care of [facility] [facility]. [place], [address] [facility],[address]   [phone]   www. [email or web address] org   -Free pregnancy testing   -Parenting classes, material services   -Referrals for prenatal care and community services

## 2022-08-12 NOTE — ED PROVIDER NOTES
[address] [facility] [address] ENCOUNTER          PHYSICIAN ASSISTANT NOTE     Date of evaluation: 8/12/2022    Chief Complaint     Other (Pt states she is here because she took a pregnancy test and it was positive wants to see how far along she is believes her last period was in May)      History of Present Illness     Rachel Alas is a 32 y.o. female who presents today for counseling regarding a positive pregnancy test.  Patient's last menstrual cycle was near the end of April. She took a positive pregnancy test last week at home. She is confident she is pregnant, but she is wondering how far along she is. Due to insurance issues she has not schedule an appoint with an OB/GYN. She otherwise has no complaints and specifically denies: Abdominal pain, vaginal bleeding, burning with urination, fevers, unexpected weight loss. She has not yet started a prenatal vitamin supplement. Review of Systems     A complete review of systems was performed and negative except as stated in the HPI. Past Medical, Surgical, Family, and Social History     She has a past medical history of Asthma. She has no past surgical history on file. Her family history is not on file. She reports that she has never smoked. She has never used smokeless tobacco. She reports current alcohol use. She reports that she does not use drugs. Medications     There are no discharge medications for this patient. Allergies     She has No Known Allergies. Physical Exam     INITIAL VITALS: BP: 136/81, Temp: 98.9 °F (37.2 °C), Heart Rate: 97, Resp: 16, SpO2: 100 %     General: Well appearing, well nourished, in no apparent state of distress. HEENT:  Normocephalic, atraumatic. Pupils equal, sclera white. Handling secretions without difficulty. Neck: No meningismus. Trachea midline    Pulmonary: Respirations even. Non labored. No tachypnea. Cardiac: Chest symmetrical and non-tender on palpation of chest wall. Abdomen: Gravid abdomen with uterine fundus just below the umbilicus. No focal tenderness. Musculoskeletal:  Ambulates under own control. Neuro:  Alert and oriented x 3. CN II - XII grossly intact. Moves all extremities spontaneously. Vascular:  2+ peripheral pulses in bilateral upper and lower extremities      Skin:  Warm and well perfused without rashes or lesions    Psych:  Appropriate mood and affect        Diagnostic Results     EKG       RADIOLOGY:  No orders to display       LABS:   No results found for this visit on 22. ED BEDSIDE ULTRASOUND:      RECENT VITALS:  BP: 136/81, Temp: 98.9 °F (37.2 °C), Heart Rate: 97, Resp: 16, SpO2: 100 %     Procedures         ED Course     Nursing Notes, Past Medical Hx, Past Surgical Hx, Social Hx, Allergies, and Family Hx were reviewed. The patient was given the following medications:  No orders of the defined types were placed in this encounter. CONSULTS:  None    MEDICAL DECISION MAKING / ASSESSMENT / Sugey Monique is a 32 y.o. female  presents today seeking counseling regarding a confirmed pregnancy from home with no prenatal care. Based on her last menstrual period (she estimates between last week April 1 week of May) she is an estimated 16-20 weeks pregnant. She has an obvious gravid abdomen on examination with no focal tenderness. She is otherwise asymptomatic. She was counseled regarding this new pregnancy. She was started prenatal vitamin supplement. She was given follow-up resources as well as 2 separate OB/GYN groups to follow-up with at her choosing. She was counseled to seek financial assistance through each group. Strict return precautions given and she was discharged in good condition. This patient was also evaluated by the attending physician. All care plans were discussed and agreed upon. Some of this note was dictated using voice recognition software.   As a result, unintended errors in grammar or spelling may exist.    Clinical Impression     1. Pregnancy, unspecified gestational age        Disposition     PATIENT REFERRED TO:  [facility]  [address] [address] [address].  Schedule an appointment as soon as possible for a visit       DISCHARGE MEDICATIONS:  There are no discharge medications for this patient.       DISPOSITION Decision To Discharge 08/12/2022 06:12:28 PM        Raquel Cuevas PA-C  08/12/22 2957

## 2022-08-12 NOTE — ED PROVIDER NOTES
ED Attending Attestation Note     Date of evaluation: 8/12/2022    This patient was seen by the advance practice provider. I agree with the workup, evaluation, management and diagnosis. The care plan has been discussed. My assessment reveals patient seeking information about what to do about being pregnant, needing OB referral for prenatal care. 16-20 weeks by LMP and no other symptoms. Will give info on OB.      Patel Dejesus MD  08/12/22 Theo Juárez

## 2023-09-05 LAB
ALBUMIN SERPL-MCNC: 4 G/DL (ref 3.4–5)
ALBUMIN/GLOB SERPL: 1.3 {RATIO} (ref 1.1–2.2)
ALP SERPL-CCNC: 81 U/L (ref 40–129)
ALT SERPL-CCNC: 14 U/L (ref 10–40)
ANION GAP SERPL CALCULATED.3IONS-SCNC: 18 MMOL/L (ref 3–16)
ANISOCYTOSIS BLD QL SMEAR: ABNORMAL
AST SERPL-CCNC: 22 U/L (ref 15–37)
BASOPHILS # BLD: 0.1 K/UL (ref 0–0.2)
BASOPHILS NFR BLD: 1.2 %
BILIRUB SERPL-MCNC: 0.4 MG/DL (ref 0–1)
BUN SERPL-MCNC: 9 MG/DL (ref 7–20)
CALCIUM SERPL-MCNC: 10 MG/DL (ref 8.3–10.6)
CHLORIDE SERPL-SCNC: 102 MMOL/L (ref 99–110)
CO2 SERPL-SCNC: 20 MMOL/L (ref 21–32)
CREAT SERPL-MCNC: 0.7 MG/DL (ref 0.6–1.1)
DEPRECATED RDW RBC AUTO: 19.2 % (ref 12.4–15.4)
EOSINOPHIL # BLD: 0.2 K/UL (ref 0–0.6)
EOSINOPHIL NFR BLD: 2.5 %
GFR SERPLBLD CREATININE-BSD FMLA CKD-EPI: >60 ML/MIN/{1.73_M2}
GLUCOSE SERPL-MCNC: 94 MG/DL (ref 70–99)
HCG SERPL QL: NEGATIVE
HCT VFR BLD AUTO: 29.3 % (ref 36–48)
HGB BLD-MCNC: 8.9 G/DL (ref 12–16)
HYPOCHROMIA BLD QL SMEAR: ABNORMAL
LIPASE SERPL-CCNC: 60 U/L (ref 13–60)
LYMPHOCYTES # BLD: 3.5 K/UL (ref 1–5.1)
LYMPHOCYTES NFR BLD: 48.5 %
MACROCYTES BLD QL SMEAR: ABNORMAL
MAGNESIUM SERPL-MCNC: 1.7 MG/DL (ref 1.8–2.4)
MCH RBC QN AUTO: 19.6 PG (ref 26–34)
MCHC RBC AUTO-ENTMCNC: 30.4 G/DL (ref 31–36)
MCV RBC AUTO: 64.3 FL (ref 80–100)
MICROCYTES BLD QL SMEAR: ABNORMAL
MONOCYTES # BLD: 0.6 K/UL (ref 0–1.3)
MONOCYTES NFR BLD: 8.5 %
NEUTROPHILS # BLD: 2.8 K/UL (ref 1.7–7.7)
NEUTROPHILS NFR BLD: 39.3 %
OVALOCYTES BLD QL SMEAR: ABNORMAL
PATH INTERP BLD-IMP: YES
PLATELET # BLD AUTO: 442 K/UL (ref 135–450)
PLATELET BLD QL SMEAR: ADEQUATE
PMV BLD AUTO: 8 FL (ref 5–10.5)
POTASSIUM SERPL-SCNC: 3.3 MMOL/L (ref 3.5–5.1)
PROT SERPL-MCNC: 7 G/DL (ref 6.4–8.2)
RBC # BLD AUTO: 4.55 M/UL (ref 4–5.2)
SLIDE REVIEW: ABNORMAL
SODIUM SERPL-SCNC: 140 MMOL/L (ref 136–145)
WBC # BLD AUTO: 7.2 K/UL (ref 4–11)

## 2023-09-05 PROCEDURE — 96360 HYDRATION IV INFUSION INIT: CPT

## 2023-09-05 PROCEDURE — 82728 ASSAY OF FERRITIN: CPT

## 2023-09-05 PROCEDURE — 85025 COMPLETE CBC W/AUTO DIFF WBC: CPT

## 2023-09-05 PROCEDURE — 36415 COLL VENOUS BLD VENIPUNCTURE: CPT

## 2023-09-05 PROCEDURE — 84703 CHORIONIC GONADOTROPIN ASSAY: CPT

## 2023-09-05 PROCEDURE — 83540 ASSAY OF IRON: CPT

## 2023-09-05 PROCEDURE — 99284 EMERGENCY DEPT VISIT MOD MDM: CPT

## 2023-09-05 PROCEDURE — 83690 ASSAY OF LIPASE: CPT

## 2023-09-05 PROCEDURE — 83550 IRON BINDING TEST: CPT

## 2023-09-05 PROCEDURE — 80053 COMPREHEN METABOLIC PANEL: CPT

## 2023-09-05 PROCEDURE — 83735 ASSAY OF MAGNESIUM: CPT

## 2023-09-05 ASSESSMENT — LIFESTYLE VARIABLES
HOW OFTEN DO YOU HAVE A DRINK CONTAINING ALCOHOL: 2-4 TIMES A MONTH
HOW MANY STANDARD DRINKS CONTAINING ALCOHOL DO YOU HAVE ON A TYPICAL DAY: 3 OR 4

## 2023-09-05 ASSESSMENT — PAIN - FUNCTIONAL ASSESSMENT
PAIN_FUNCTIONAL_ASSESSMENT: ACTIVITIES ARE NOT PREVENTED
PAIN_FUNCTIONAL_ASSESSMENT: 0-10

## 2023-09-05 ASSESSMENT — PAIN DESCRIPTION - ORIENTATION: ORIENTATION: MID;LOWER

## 2023-09-05 ASSESSMENT — PAIN DESCRIPTION - FREQUENCY: FREQUENCY: INTERMITTENT

## 2023-09-05 ASSESSMENT — PAIN DESCRIPTION - LOCATION: LOCATION: ABDOMEN

## 2023-09-05 ASSESSMENT — PAIN SCALES - GENERAL: PAINLEVEL_OUTOF10: 6

## 2023-09-05 ASSESSMENT — PAIN DESCRIPTION - DESCRIPTORS: DESCRIPTORS: CRAMPING;SHARP

## 2023-09-06 ENCOUNTER — HOSPITAL ENCOUNTER (EMERGENCY)
Age: 28
Discharge: HOME | End: 2023-09-06
Attending: EMERGENCY MEDICINE
Payer: MEDICAID

## 2023-09-06 VITALS
BODY MASS INDEX: 26.22 KG/M2 | OXYGEN SATURATION: 100 % | RESPIRATION RATE: 18 BRPM | SYSTOLIC BLOOD PRESSURE: 116 MMHG | TEMPERATURE: 98.2 F | HEART RATE: 92 BPM | HEIGHT: 59 IN | DIASTOLIC BLOOD PRESSURE: 77 MMHG | WEIGHT: 130.07 LBS

## 2023-09-06 DIAGNOSIS — A08.4 VIRAL GASTROENTERITIS: Primary | ICD-10-CM

## 2023-09-06 DIAGNOSIS — D50.9 IRON DEFICIENCY ANEMIA, UNSPECIFIED IRON DEFICIENCY ANEMIA TYPE: ICD-10-CM

## 2023-09-06 DIAGNOSIS — E86.0 DEHYDRATION, MILD: ICD-10-CM

## 2023-09-06 LAB
BACTERIA URNS QL MICRO: ABNORMAL /HPF
BILIRUB UR QL STRIP.AUTO: NEGATIVE
CLARITY UR: CLEAR
COLOR UR: YELLOW
EPI CELLS #/AREA URNS HPF: ABNORMAL /HPF (ref 0–5)
FERRITIN SERPL IA-MCNC: 7.5 NG/ML (ref 15–150)
GLUCOSE UR STRIP.AUTO-MCNC: NEGATIVE MG/DL
HGB UR QL STRIP.AUTO: NEGATIVE
IRON SATN MFR SERPL: 3 % (ref 15–50)
IRON SERPL-MCNC: 13 UG/DL (ref 37–145)
KETONES UR STRIP.AUTO-MCNC: NEGATIVE MG/DL
LEUKOCYTE ESTERASE UR QL STRIP.AUTO: ABNORMAL
NITRITE UR QL STRIP.AUTO: NEGATIVE
PATH INTERP BLD-IMP: NORMAL
PH UR STRIP.AUTO: 6 [PH] (ref 5–8)
PROT UR STRIP.AUTO-MCNC: NEGATIVE MG/DL
RBC #/AREA URNS HPF: ABNORMAL /HPF (ref 0–4)
SARS-COV-2 RDRP RESP QL NAA+PROBE: NOT DETECTED
SP GR UR STRIP.AUTO: 1.02 (ref 1–1.03)
TIBC SERPL-MCNC: 435 UG/DL (ref 260–445)
UA COMPLETE W REFLEX CULTURE PNL UR: ABNORMAL
UA DIPSTICK W REFLEX MICRO PNL UR: YES
URN SPEC COLLECT METH UR: ABNORMAL
UROBILINOGEN UR STRIP-ACNC: 0.2 E.U./DL
WBC #/AREA URNS HPF: ABNORMAL /HPF (ref 0–5)

## 2023-09-06 PROCEDURE — 2580000003 HC RX 258: Performed by: PHYSICIAN ASSISTANT

## 2023-09-06 PROCEDURE — 81001 URINALYSIS AUTO W/SCOPE: CPT

## 2023-09-06 PROCEDURE — 87635 SARS-COV-2 COVID-19 AMP PRB: CPT

## 2023-09-06 PROCEDURE — 96360 HYDRATION IV INFUSION INIT: CPT

## 2023-09-06 RX ORDER — DICYCLOMINE HYDROCHLORIDE 10 MG/1
10 CAPSULE ORAL 4 TIMES DAILY PRN
Qty: 28 CAPSULE | Refills: 0 | Status: SHIPPED | OUTPATIENT
Start: 2023-09-06 | End: 2023-09-13

## 2023-09-06 RX ORDER — ONDANSETRON 4 MG/1
4 TABLET, ORALLY DISINTEGRATING ORAL 3 TIMES DAILY PRN
Qty: 21 TABLET | Refills: 0 | Status: SHIPPED | OUTPATIENT
Start: 2023-09-06

## 2023-09-06 RX ORDER — SODIUM CHLORIDE, SODIUM LACTATE, POTASSIUM CHLORIDE, AND CALCIUM CHLORIDE .6; .31; .03; .02 G/100ML; G/100ML; G/100ML; G/100ML
1000 INJECTION, SOLUTION INTRAVENOUS ONCE
Status: COMPLETED | OUTPATIENT
Start: 2023-09-06 | End: 2023-09-06

## 2023-09-06 RX ADMIN — SODIUM CHLORIDE, SODIUM LACTATE, POTASSIUM CHLORIDE, AND CALCIUM CHLORIDE 1000 ML: .6; .31; .03; .02 INJECTION, SOLUTION INTRAVENOUS at 01:33

## 2023-09-06 NOTE — DISCHARGE INSTRUCTIONS
Thank you for choosing [address] for your emergency care today. We take a lot of pride in the fact that you chose us for your care and we strived to do everything necessary to provide you with excellent medical care. We hope you agree that you received excellent care today. To continue that excellent medical care, the following information very important that you read and understand before you leave the emergency department today. It contains information about:  Your diagnosis  What was done for you in the emergency department  What you can expect from your illness or injury moving forward  The steps you will need to take after leaving the emergency department to help achieve the best possible outcome for your illness or injury. What we did in the Emergency Department Today:     You were seen in the Emergency Department today by Hilda Headley PA-C.      You had the following lab abnormalities:  Labs Reviewed   CBC WITH AUTO DIFFERENTIAL - Abnormal; Notable for the following components:       Result Value    Hemoglobin 8.9 (*)     Hematocrit 29.3 (*)     MCV 64.3 (*)     MCH 19.6 (*)     MCHC 30.4 (*)     RDW 19.2 (*)     Anisocytosis 2+ (*)     Macrocytes Occasional (*)     Microcytes 2+ (*)     Hypochromia 2+ (*)     Ovalocytes Occasional (*)     All other components within normal limits   COMPREHENSIVE METABOLIC PANEL W/ REFLEX TO MG FOR LOW K - Abnormal; Notable for the following components:    Potassium reflex Magnesium 3.3 (*)     CO2 20 (*)     Anion Gap 18 (*)     All other components within normal limits   MAGNESIUM - Abnormal; Notable for the following components:    Magnesium 1.70 (*)     All other components within normal limits   URINALYSIS WITH REFLEX TO CULTURE - Abnormal; Notable for the following components:    Leukocyte Esterase, Urine SMALL (*)     All other components within normal limits   FERRITIN - Abnormal; Notable for the following components:

## 2023-09-06 NOTE — ED PROVIDER NOTES
ED Attending Attestation Note     Date of evaluation: 9/5/2023    This patient was seen by the advance practice provider. I have seen and examined the patient, agree with the workup, evaluation, management and diagnosis. The care plan has been discussed. My assessment reveals complaints of nausea, vomiting, and diarrhea has been going on for approximately 1 week. She has multiple family members with similar symptoms. She describes the vomiting as nonbloody and nonbilious and the diarrhea is nonbloody. She does have a soft abdomen on exam with mild generalized tenderness but no focal tenderness. We will check laboratory studies, treat symptomatically.        Meredith Gibson MD  09/06/23 7544

## 2023-09-06 NOTE — ED NOTES
Reviewed discharge instructions, medication reconciliation, and patient education information with patient. Patient stated understanding, and answered questions to satisfaction. Patient verbalized satisfaction of care. PIV removed at this time. Patient ambulated safely to exit with a steady gait in no obvious acute distress. Patient verbalized understanding of returning to ER if symptoms worsen, and to follow up with primary health care provider.       Isis Munoz RN  09/06/23 6193

## 2023-09-13 ASSESSMENT — ENCOUNTER SYMPTOMS
NAUSEA: 1
ABDOMINAL DISTENTION: 0
SHORTNESS OF BREATH: 0
SORE THROAT: 0
EYE PAIN: 0
COUGH: 0
WHEEZING: 0
RHINORRHEA: 0
EYE ITCHING: 0
ABDOMINAL PAIN: 1
SINUS PRESSURE: 0
DIARRHEA: 0
COLOR CHANGE: 0
BACK PAIN: 0
EYE REDNESS: 0
VOMITING: 1
CHEST TIGHTNESS: 0

## 2023-09-13 NOTE — ED PROVIDER NOTES
reviewed and addressed in the HPI. Stella Zaman is a 25 y.o. female who presents to the emergency department with a complaint of generalized abdominal discomfort, nausea, vomiting and lightheadedness. The patient is been experiencing symptoms for approximately a week with poor food intake. She reports that she is unable to tolerate liquids. The patient reports that her niece had come home from school with gastrointestinal symptoms and that several members of her household have subsequently ended up with similar symptoms, a few of which have required emergency department evaluation. She has been exposed to all of these individuals over the past 2 weeks as they have been symptomatic. On arrival to the emergency department, the patient is hemodynamically stable and within normal limits. Afebrile. In no distress. A rapid COVID PCR swab was performed at presentation to the emergency department and is negative. On diagnostic evaluation, the patient's CBC demonstrates no leukocytosis, making systemic infection or infection less likely and is reassuring. The patient is anemic with a hemoglobin of 8.9 and hematocrit of 29.3 with an MCV of 64.3 and RDW of 19.2 suggesting chronic anemia. Patient is not currently on any iron supplementation or management for her microcytic anemia, so an iron panel and ferritin will be sent at this time to assist the patient with follow-up of this condition. She does not demonstrate any signs or symptoms consistent with symptomatic acute anemia at this time. Her baseline hemoglobin is typically around the upper 9-10 range. Patient no platelet abnormalities. Their BMP demonstrates slight hypokalemia with potassium of 3.3 and mild hypomagnesemia with a magnesium of 1.7, but otherwise no significant electrolyte abnormalities, evidence of acute kidney injury or acute renal dysfunction. there is a slight anion gap elevation with a decrease in bicarbonate but appears consistent negative. Past Medical, Surgical, Family, and Social History     She has a past medical history of Asthma. She has no past surgical history on file. Her family history is not on file. She reports that she has never smoked. She has never used smokeless tobacco. She reports current alcohol use. She reports that she does not use drugs. Medications     Discharge Medication List as of 9/6/2023  2:29 AM          Allergies     She has No Known Allergies.         [id number]  09/13/23 [id number]

## 2024-12-04 ENCOUNTER — APPOINTMENT (OUTPATIENT)
Dept: GENERAL RADIOLOGY | Age: 29
End: 2024-12-04

## 2024-12-04 ENCOUNTER — APPOINTMENT (OUTPATIENT)
Dept: CT IMAGING | Age: 29
End: 2024-12-04

## 2024-12-04 ENCOUNTER — HOSPITAL ENCOUNTER (EMERGENCY)
Age: 29
Discharge: HOME | End: 2024-12-04
Attending: EMERGENCY MEDICINE

## 2024-12-04 VITALS
DIASTOLIC BLOOD PRESSURE: 99 MMHG | TEMPERATURE: 98.6 F | HEIGHT: 60 IN | SYSTOLIC BLOOD PRESSURE: 144 MMHG | RESPIRATION RATE: 18 BRPM | BODY MASS INDEX: 28.47 KG/M2 | OXYGEN SATURATION: 100 % | HEART RATE: 109 BPM | WEIGHT: 145 LBS

## 2024-12-04 DIAGNOSIS — J03.90 ACUTE TONSILLITIS, UNSPECIFIED ETIOLOGY: Primary | ICD-10-CM

## 2024-12-04 LAB
ANION GAP SERPL CALCULATED.3IONS-SCNC: 13 MMOL/L (ref 3–16)
ANION GAP SERPL CALCULATED.3IONS-SCNC: 13 MMOL/L (ref 3–16)
BASOPHILS # BLD: 0.1 K/UL (ref 0–0.2)
BASOPHILS NFR BLD: 0.5 %
BUN SERPL-MCNC: 5 MG/DL (ref 7–20)
BUN SERPL-MCNC: 5 MG/DL (ref 7–20)
CALCIUM SERPL-MCNC: 9.4 MG/DL (ref 8.3–10.6)
CALCIUM SERPL-MCNC: 9.6 MG/DL (ref 8.3–10.6)
CHLORIDE SERPL-SCNC: 100 MMOL/L (ref 99–110)
CHLORIDE SERPL-SCNC: 100 MMOL/L (ref 99–110)
CO2 SERPL-SCNC: 20 MMOL/L (ref 21–32)
CO2 SERPL-SCNC: 21 MMOL/L (ref 21–32)
CREAT SERPL-MCNC: 0.6 MG/DL (ref 0.6–1.1)
CREAT SERPL-MCNC: 0.7 MG/DL (ref 0.6–1.1)
DEPRECATED RDW RBC AUTO: 20.7 % (ref 12.4–15.4)
EKG ATRIAL RATE: 104 BPM
EKG DIAGNOSIS: NORMAL
EKG P AXIS: 53 DEGREES
EKG P-R INTERVAL: 138 MS
EKG Q-T INTERVAL: 314 MS
EKG QRS DURATION: 84 MS
EKG QTC CALCULATION (BAZETT): 411 MS
EKG R AXIS: 38 DEGREES
EKG T AXIS: 22 DEGREES
EKG VENTRICULAR RATE: 103 BPM
EOSINOPHIL # BLD: 0 K/UL (ref 0–0.6)
EOSINOPHIL NFR BLD: 0.2 %
FLUAV RNA RESP QL NAA+PROBE: NOT DETECTED
FLUBV RNA RESP QL NAA+PROBE: NOT DETECTED
GFR SERPLBLD CREATININE-BSD FMLA CKD-EPI: >90 ML/MIN/{1.73_M2}
GFR SERPLBLD CREATININE-BSD FMLA CKD-EPI: >90 ML/MIN/{1.73_M2}
GLUCOSE SERPL-MCNC: 123 MG/DL (ref 70–99)
GLUCOSE SERPL-MCNC: 99 MG/DL (ref 70–99)
HCG SERPL QL: NEGATIVE
HCT VFR BLD AUTO: 30.5 % (ref 36–48)
HGB BLD-MCNC: 9.3 G/DL (ref 12–16)
LYMPHOCYTES # BLD: 1.9 K/UL (ref 1–5.1)
LYMPHOCYTES NFR BLD: 12.1 %
MAGNESIUM SERPL-MCNC: 1.23 MG/DL (ref 1.8–2.4)
MAGNESIUM SERPL-MCNC: 3.59 MG/DL (ref 1.8–2.4)
MCH RBC QN AUTO: 21.3 PG (ref 26–34)
MCHC RBC AUTO-ENTMCNC: 30.7 G/DL (ref 31–36)
MCV RBC AUTO: 69.4 FL (ref 80–100)
MONOCYTES # BLD: 1 K/UL (ref 0–1.3)
MONOCYTES NFR BLD: 6.4 %
NEUTROPHILS # BLD: 12.5 K/UL (ref 1.7–7.7)
NEUTROPHILS NFR BLD: 80.8 %
PLATELET # BLD AUTO: 331 K/UL (ref 135–450)
PMV BLD AUTO: 8.6 FL (ref 5–10.5)
POTASSIUM SERPL-SCNC: 3.3 MMOL/L (ref 3.5–5.1)
POTASSIUM SERPL-SCNC: 3.6 MMOL/L (ref 3.5–5.1)
RBC # BLD AUTO: 4.4 M/UL (ref 4–5.2)
S PYO AG THROAT QL: NEGATIVE
SARS-COV-2 RNA RESP QL NAA+PROBE: NOT DETECTED
SODIUM SERPL-SCNC: 133 MMOL/L (ref 136–145)
SODIUM SERPL-SCNC: 134 MMOL/L (ref 136–145)
TROPONIN, HIGH SENSITIVITY: <6 NG/L (ref 0–14)
WBC # BLD AUTO: 15.5 K/UL (ref 4–11)

## 2024-12-04 PROCEDURE — 83735 ASSAY OF MAGNESIUM: CPT

## 2024-12-04 PROCEDURE — 71045 X-RAY EXAM CHEST 1 VIEW: CPT

## 2024-12-04 PROCEDURE — 80048 BASIC METABOLIC PNL TOTAL CA: CPT

## 2024-12-04 PROCEDURE — 93005 ELECTROCARDIOGRAM TRACING: CPT

## 2024-12-04 PROCEDURE — 84484 ASSAY OF TROPONIN QUANT: CPT

## 2024-12-04 PROCEDURE — 87880 STREP A ASSAY W/OPTIC: CPT

## 2024-12-04 PROCEDURE — 84703 CHORIONIC GONADOTROPIN ASSAY: CPT

## 2024-12-04 PROCEDURE — 85025 COMPLETE CBC W/AUTO DIFF WBC: CPT

## 2024-12-04 PROCEDURE — 99285 EMERGENCY DEPT VISIT HI MDM: CPT

## 2024-12-04 PROCEDURE — 96374 THER/PROPH/DIAG INJ IV PUSH: CPT

## 2024-12-04 PROCEDURE — 6360000002 HC RX W HCPCS

## 2024-12-04 PROCEDURE — 6360000004 HC RX CONTRAST MEDICATION

## 2024-12-04 PROCEDURE — 96375 TX/PRO/DX INJ NEW DRUG ADDON: CPT

## 2024-12-04 PROCEDURE — 6360000002 HC RX W HCPCS: Performed by: EMERGENCY MEDICINE

## 2024-12-04 PROCEDURE — 70491 CT SOFT TISSUE NECK W/DYE: CPT

## 2024-12-04 PROCEDURE — 87636 SARSCOV2 & INF A&B AMP PRB: CPT

## 2024-12-04 RX ORDER — DEXAMETHASONE SODIUM PHOSPHATE 4 MG/ML
4 INJECTION, SOLUTION INTRA-ARTICULAR; INTRALESIONAL; INTRAMUSCULAR; INTRAVENOUS; SOFT TISSUE ONCE
Status: COMPLETED | OUTPATIENT
Start: 2024-12-04 | End: 2024-12-04

## 2024-12-04 RX ORDER — PENICILLIN V POTASSIUM 500 MG/1
500 TABLET, FILM COATED ORAL 4 TIMES DAILY
Qty: 28 TABLET | Refills: 0 | Status: SHIPPED | OUTPATIENT
Start: 2024-12-04 | End: 2024-12-11

## 2024-12-04 RX ORDER — IOPAMIDOL 755 MG/ML
75 INJECTION, SOLUTION INTRAVASCULAR
Status: COMPLETED | OUTPATIENT
Start: 2024-12-04 | End: 2024-12-04

## 2024-12-04 RX ORDER — KETOROLAC TROMETHAMINE 30 MG/ML
15 INJECTION, SOLUTION INTRAMUSCULAR; INTRAVENOUS ONCE
Status: COMPLETED | OUTPATIENT
Start: 2024-12-04 | End: 2024-12-04

## 2024-12-04 RX ADMIN — DEXAMETHASONE SODIUM PHOSPHATE 4 MG: 4 INJECTION INTRA-ARTICULAR; INTRALESIONAL; INTRAMUSCULAR; INTRAVENOUS; SOFT TISSUE at 18:05

## 2024-12-04 RX ADMIN — IOPAMIDOL 75 ML: 755 INJECTION, SOLUTION INTRAVENOUS at 16:49

## 2024-12-04 RX ADMIN — KETOROLAC TROMETHAMINE 15 MG: 30 INJECTION INTRAMUSCULAR; INTRAVENOUS at 15:11

## 2024-12-04 ASSESSMENT — LIFESTYLE VARIABLES
HOW OFTEN DO YOU HAVE A DRINK CONTAINING ALCOHOL: NEVER
HOW MANY STANDARD DRINKS CONTAINING ALCOHOL DO YOU HAVE ON A TYPICAL DAY: PATIENT DOES NOT DRINK

## 2024-12-04 ASSESSMENT — PAIN SCALES - GENERAL
PAINLEVEL_OUTOF10: 5
PAINLEVEL_OUTOF10: 3

## 2024-12-04 ASSESSMENT — PAIN - FUNCTIONAL ASSESSMENT: PAIN_FUNCTIONAL_ASSESSMENT: 0-10

## 2024-12-04 ASSESSMENT — PAIN DESCRIPTION - LOCATION: LOCATION: THROAT

## 2024-12-04 ASSESSMENT — PAIN DESCRIPTION - ORIENTATION: ORIENTATION: MID

## 2024-12-04 ASSESSMENT — PAIN DESCRIPTION - PAIN TYPE: TYPE: ACUTE PAIN

## 2024-12-04 NOTE — ED PROVIDER NOTES
[facility]  EMERGENCY DEPARTMENT ENCOUNTER          PHYSICIAN ASSISTANT NOTE       Date of evaluation: 12/4/2024    Chief Complaint     Pharyngitis and Chills (Patient with chief complaint of sore throat that started this morning. Patient states she was outside working on her car for 10 hours on Monday and has had chills since then, denies being around anyone who has been sick)      History of Present Illness     Candance Russell is a 29 y.o. female past medical history of asthma who presents with    ASSESSMENT / PLAN  (MEDICAL DECISION MAKING)     INITIAL VITALS: BP: (!) 167/115, Temp: 98.6 °F (37 °C), Pulse: (!) 115, Respirations: 20, SpO2: 100 %    Candance Russell is a 29 y.o. female no pertinent past medical history presenting to the ED with right-sided sore throat, pain with swallowing, and chest pain.  She states her symptoms started this morning but she has had the sore throat for the past few days.  She has no trouble swallowing or trouble handling her oral secretions. She denies cough.  No congestion or runny nose, no fevers.     Is this patient to be included in the SEP-1 core measure? {Sep-1 Core Yes/No:014600}    Medical Decision Making  Amount and/or Complexity of Data Reviewed  Labs: ordered.  Radiology: ordered.  ECG/medicine tests: ordered.    Risk  Prescription drug management.        This patient was also evaluated by the attending physician. All care plans were discussed and agreed upon.    Clinical Impression     No diagnosis found.    Disposition     PATIENT REFERRED TO:  No follow-up provider specified.    DISCHARGE MEDICATIONS:  New Prescriptions    No medications on file       DISPOSITION                   Diagnostic Results and Other Data     RADIOLOGY:  XR CHEST PORTABLE   Final Result      No acute findings are seen.      Electronically signed by Preston Floyd      CT SOFT TISSUE NECK W CONTRAST    (Results Pending)       LABS:   Results for orders placed or performed during the

## 2024-12-04 NOTE — ED PROVIDER NOTES
ED Attending Attestation Note     Date of evaluation: 12/4/2024    This patient was seen by the advance practice provider.  I have seen and examined the patient, agree with the workup, evaluation, management and diagnosis. The care plan has been discussed.  My assessment reveals patient with sore throat and trouble swallowing.  Right tonsillar exudate and erythema on exam.  +leukocytosis.  CT neck showing tonsillitis only.  Will start on PCN and give dose of decadron here for comfort.     Porter Gibson MD  12/04/24 1114

## 2025-02-27 ENCOUNTER — HOSPITAL ENCOUNTER (EMERGENCY)
Age: 30
Discharge: HOME | End: 2025-02-27
Attending: STUDENT IN AN ORGANIZED HEALTH CARE EDUCATION/TRAINING PROGRAM

## 2025-02-27 VITALS
WEIGHT: 148.5 LBS | HEART RATE: 97 BPM | HEIGHT: 59 IN | BODY MASS INDEX: 29.94 KG/M2 | RESPIRATION RATE: 16 BRPM | SYSTOLIC BLOOD PRESSURE: 144 MMHG | DIASTOLIC BLOOD PRESSURE: 89 MMHG | OXYGEN SATURATION: 94 % | TEMPERATURE: 98 F

## 2025-02-27 DIAGNOSIS — N94.6 DYSMENORRHEA: Primary | ICD-10-CM

## 2025-02-27 LAB
ALBUMIN SERPL-MCNC: 4.6 G/DL (ref 3.4–5)
ANION GAP SERPL CALCULATED.3IONS-SCNC: 16 MMOL/L (ref 3–16)
BACTERIA URNS QL MICRO: ABNORMAL /HPF
BASOPHILS # BLD: 0.1 K/UL (ref 0–0.2)
BASOPHILS NFR BLD: 0.7 %
BILIRUB UR QL STRIP.AUTO: NEGATIVE
BUN SERPL-MCNC: 9 MG/DL (ref 7–20)
CALCIUM SERPL-MCNC: 9.9 MG/DL (ref 8.3–10.6)
CHLORIDE SERPL-SCNC: 105 MMOL/L (ref 99–110)
CLARITY UR: CLEAR
CO2 SERPL-SCNC: 18 MMOL/L (ref 21–32)
COLOR UR: YELLOW
CREAT SERPL-MCNC: 0.8 MG/DL (ref 0.6–1.1)
DEPRECATED RDW RBC AUTO: 19.7 % (ref 12.4–15.4)
EOSINOPHIL # BLD: 0.2 K/UL (ref 0–0.6)
EOSINOPHIL NFR BLD: 1.4 %
EPI CELLS #/AREA URNS HPF: ABNORMAL /HPF (ref 0–5)
GFR SERPLBLD CREATININE-BSD FMLA CKD-EPI: >90 ML/MIN/{1.73_M2}
GLUCOSE SERPL-MCNC: 91 MG/DL (ref 70–99)
GLUCOSE UR STRIP.AUTO-MCNC: NEGATIVE MG/DL
HCG UR QL: NEGATIVE
HCT VFR BLD AUTO: 30.1 % (ref 36–48)
HGB BLD-MCNC: 9.2 G/DL (ref 12–16)
HGB UR QL STRIP.AUTO: ABNORMAL
KETONES UR STRIP.AUTO-MCNC: NEGATIVE MG/DL
LEUKOCYTE ESTERASE UR QL STRIP.AUTO: NEGATIVE
LYMPHOCYTES # BLD: 4.7 K/UL (ref 1–5.1)
LYMPHOCYTES NFR BLD: 40.8 %
MCH RBC QN AUTO: 20.7 PG (ref 26–34)
MCHC RBC AUTO-ENTMCNC: 30.5 G/DL (ref 31–36)
MCV RBC AUTO: 67.7 FL (ref 80–100)
MONOCYTES # BLD: 0.8 K/UL (ref 0–1.3)
MONOCYTES NFR BLD: 6.9 %
NEUTROPHILS # BLD: 5.8 K/UL (ref 1.7–7.7)
NEUTROPHILS NFR BLD: 50.2 %
NITRITE UR QL STRIP.AUTO: NEGATIVE
PH UR STRIP.AUTO: 6 [PH] (ref 5–8)
PHOSPHATE SERPL-MCNC: 2.8 MG/DL (ref 2.5–4.9)
PLATELET # BLD AUTO: 403 K/UL (ref 135–450)
PMV BLD AUTO: 8.2 FL (ref 5–10.5)
POTASSIUM SERPL-SCNC: 3.6 MMOL/L (ref 3.5–5.1)
PROT UR STRIP.AUTO-MCNC: NEGATIVE MG/DL
RBC # BLD AUTO: 4.45 M/UL (ref 4–5.2)
RBC #/AREA URNS HPF: ABNORMAL /HPF (ref 0–4)
SODIUM SERPL-SCNC: 139 MMOL/L (ref 136–145)
SP GR UR STRIP.AUTO: 1.01 (ref 1–1.03)
T4 FREE SERPL-MCNC: 1.2 NG/DL (ref 0.9–1.8)
TSH SERPL DL<=0.005 MIU/L-ACNC: 0.15 UIU/ML (ref 0.27–4.2)
UA DIPSTICK W REFLEX MICRO PNL UR: YES
URN SPEC COLLECT METH UR: ABNORMAL
UROBILINOGEN UR STRIP-ACNC: 0.2 E.U./DL
WBC # BLD AUTO: 11.5 K/UL (ref 4–11)
WBC #/AREA URNS HPF: ABNORMAL /HPF (ref 0–5)

## 2025-02-27 PROCEDURE — 81001 URINALYSIS AUTO W/SCOPE: CPT

## 2025-02-27 PROCEDURE — 36415 COLL VENOUS BLD VENIPUNCTURE: CPT

## 2025-02-27 PROCEDURE — 99283 EMERGENCY DEPT VISIT LOW MDM: CPT

## 2025-02-27 PROCEDURE — 85025 COMPLETE CBC W/AUTO DIFF WBC: CPT

## 2025-02-27 PROCEDURE — 80069 RENAL FUNCTION PANEL: CPT

## 2025-02-27 PROCEDURE — 84703 CHORIONIC GONADOTROPIN ASSAY: CPT

## 2025-02-27 PROCEDURE — 84443 ASSAY THYROID STIM HORMONE: CPT

## 2025-02-27 PROCEDURE — 84439 ASSAY OF FREE THYROXINE: CPT

## 2025-02-27 PROCEDURE — 6370000000 HC RX 637 (ALT 250 FOR IP)

## 2025-02-27 RX ORDER — IBUPROFEN 400 MG/1
800 TABLET, FILM COATED ORAL ONCE
Status: COMPLETED | OUTPATIENT
Start: 2025-02-27 | End: 2025-02-27

## 2025-02-27 RX ORDER — IBUPROFEN 400 MG/1
400 TABLET, FILM COATED ORAL EVERY 6 HOURS PRN
Status: DISCONTINUED | OUTPATIENT
Start: 2025-02-27 | End: 2025-02-27

## 2025-02-27 RX ORDER — IBUPROFEN 400 MG/1
400 TABLET, FILM COATED ORAL EVERY 6 HOURS PRN
Qty: 20 TABLET | Refills: 0 | Status: SHIPPED | OUTPATIENT
Start: 2025-02-27 | End: 2025-03-04

## 2025-02-27 RX ADMIN — IBUPROFEN 800 MG: 400 TABLET, FILM COATED ORAL at 22:41

## 2025-02-27 ASSESSMENT — ENCOUNTER SYMPTOMS
NAUSEA: 1
DIARRHEA: 0
RESPIRATORY NEGATIVE: 1
ABDOMINAL PAIN: 1
CONSTIPATION: 0

## 2025-02-27 ASSESSMENT — PAIN - FUNCTIONAL ASSESSMENT: PAIN_FUNCTIONAL_ASSESSMENT: 0-10

## 2025-02-27 ASSESSMENT — PAIN SCALES - GENERAL: PAINLEVEL_OUTOF10: 7

## 2025-02-28 NOTE — ED PROVIDER NOTES
ED Attending Attestation Note     Date of evaluation: 2/27/2025    I have discussed the case with the resident. I have personally performed a history, physical exam, and my own medical decision making. I have reviewed the note and agree with the findings and plan.     INITIAL VITALS: BP: (!) 144/89, Temp: 98 °F (36.7 °C), Pulse: (!) 101, Respirations: 16, SpO2: 94 %     MDM:  My assessment reveals a well-appearing 29-year-old female presenting with abdominal cramping during her period.  She is only been taking Tylenol which has not been helping with the cramps.  She has particularly heavy periods and would like a referral to gynecology.  She also is concerned for a lump in her breast.  On examination, she has a benign abdomen and is hemodynamically stable.  Her UPT is negative today.  She will be instructed on NSAIDs for pain during her menstrual cycle and given referral to gynecology.         Hussein Summers MD  02/27/25 4420    
Calcium 9.9 8.3 - 10.6 mg/dL    Phosphorus 2.8 2.5 - 4.9 mg/dL    Albumin 4.6 3.4 - 5.0 g/dL   TSH reflex to FT4   Result Value Ref Range    TSH Reflex FT4 0.15 (L) 0.27 - 4.20 uIU/mL   T4, Free   Result Value Ref Range    T4 Free 1.2 0.9 - 1.8 ng/dL         ED BEDSIDE ULTRASOUND:  No results found.    RECENT VITALS:  BP: (!) 144/89, Temp: 98 °F (36.7 °C),Pulse: 97, Respirations: 16, SpO2: 94 %     Procedures         ED Course     Nursing Notes, Past Medical Hx, Past Surgical Hx, Social Hx, Allergies, and FamilyHx were reviewed.         The patient was given the following medications:  Orders Placed This Encounter   Medications    ibuprofen (ADVIL;MOTRIN) tablet 800 mg    DISCONTD: ibuprofen (ADVIL;MOTRIN) tablet 400 mg    DISCONTD: ibuprofen (ADVIL;MOTRIN) tablet 400 mg    ibuprofen (ADVIL;MOTRIN) 400 MG tablet     Sig: Take 1 tablet by mouth every 6 hours as needed for Pain (for menstrual cramps. please take medication with meals)     Dispense:  20 tablet     Refill:  0       CONSULTS:  None    Review of Systems   HENT: Negative.     Respiratory: Negative.     Cardiovascular: Negative.    Gastrointestinal:  Positive for abdominal pain and nausea. Negative for constipation and diarrhea.   Genitourinary: Negative.    Neurological:  Positive for light-headedness.       Past Medical, Surgical, Family, and Social History     She has a past medical history of Asthma.  She has no past surgical history on file.  Her family history is not on file.  She reports that she has never smoked. She has never used smokeless tobacco. She reports current alcohol use. She reports that she does not use drugs.    Medications     Current Discharge Medication List        CONTINUE these medications which have NOT CHANGED    Details   ondansetron (ZOFRAN-ODT) 4 MG disintegrating tablet Take 1 tablet by mouth 3 times daily as needed for Nausea or Vomiting  Qty: 21 tablet, Refills: 0      dicyclomine (BENTYL) 10 MG capsule Take 1 capsule by

## 2025-02-28 NOTE — DISCHARGE INSTRUCTIONS
It is recommended to take-400 Mg 4 times daily as needed for menstrual cramps.      Please follow-up with gynecology as recommended for evaluation of your symptoms of painful periods.     Please establish and follow-up with PCP for management of further symptoms.    Please seek immediate medical attention by calling 911 or going to the nearest ED if symptoms worsen/recur.